# Patient Record
Sex: FEMALE | Race: WHITE | HISPANIC OR LATINO
[De-identification: names, ages, dates, MRNs, and addresses within clinical notes are randomized per-mention and may not be internally consistent; named-entity substitution may affect disease eponyms.]

---

## 2022-03-22 ENCOUNTER — NON-APPOINTMENT (OUTPATIENT)
Age: 62
End: 2022-03-22

## 2022-03-29 ENCOUNTER — APPOINTMENT (OUTPATIENT)
Dept: BREAST CENTER | Facility: CLINIC | Age: 62
End: 2022-03-29
Payer: MEDICAID

## 2022-03-29 VITALS
SYSTOLIC BLOOD PRESSURE: 146 MMHG | DIASTOLIC BLOOD PRESSURE: 90 MMHG | HEART RATE: 105 BPM | HEIGHT: 62 IN | BODY MASS INDEX: 31.33 KG/M2 | WEIGHT: 170.25 LBS

## 2022-03-29 DIAGNOSIS — Z78.9 OTHER SPECIFIED HEALTH STATUS: ICD-10-CM

## 2022-03-29 PROCEDURE — 99203 OFFICE O/P NEW LOW 30 MIN: CPT

## 2022-03-29 RX ORDER — MULTIVITAMIN
TABLET ORAL
Refills: 0 | Status: ACTIVE | COMMUNITY

## 2022-03-29 RX ORDER — PNV NO.95/FERROUS FUM/FOLIC AC 28MG-0.8MG
TABLET ORAL
Refills: 0 | Status: ACTIVE | COMMUNITY

## 2022-03-29 RX ORDER — LEVOTHYROXINE SODIUM 137 UG/1
TABLET ORAL
Refills: 0 | Status: ACTIVE | COMMUNITY

## 2022-03-30 NOTE — PHYSICAL EXAM
[Normocephalic] : normocephalic [Atraumatic] : atraumatic [Supple] : supple [No Supraclavicular Adenopathy] : no supraclavicular adenopathy [Examined in the supine and seated position] : examined in the supine and seated position [No dominant masses] : no dominant masses in right breast  [No dominant masses] : no dominant masses left breast [No Nipple Retraction] : no left nipple retraction [No Nipple Discharge] : no left nipple discharge [No Axillary Lymphadenopathy] : no left axillary lymphadenopathy [No Edema] : no edema [No Rashes] : no rashes [No Ulceration] : no ulceration [de-identified] : L C cup>>R small B cup

## 2022-03-30 NOTE — DATA REVIEWED
[FreeTextEntry1] : 12/13/2021 ([facility]) bilateral screening mammogram showing scattered areas of fibroglandular density, No suspicious masses, architectural distortion, or significant calcifications are detected. There are stable bilateral morphologically benign-appearing axillary tail lymph nodes. IMPRESSION: No mammographic evidence of malignancy. FOLLOW-UP: Follow-up imaging in 12 months. ASSESSMENT: BI-RADS Category 2: Benign

## 2022-03-30 NOTE — HISTORY OF PRESENT ILLNESS
[FreeTextEntry1] : Josue is a 62 year old Romanian speaking female ( #[id number]), referred by Dr. Bartolo Moreno (PCP), who presents for initial evaluation regarding a change in size in her left breast; she states that over the past year her left breast has grown in size. She states that she has breast asymmetry since she was 12 years old L > R.. Pt denies palpable masses, skin lesions/changes, nipple discharge, or other breast complaints.\par \par JENNIFER lifetime risk is 7.2% no family history of breast or ovarian cancer\par \par Patient states that she has gained around 10lbs in the past couple years.\par \par Discussed that this is not an uncommon issue. Imaging is benign so the likely cause is the recent weight gain. Recommended consult with plastic surgeon for matching procedure.

## 2022-03-30 NOTE — PAST MEDICAL HISTORY
[Menarche Age ____] : age at menarche was [unfilled] [Menopause Age____] : age at menopause was [unfilled] [Total Preg ___] : G[unfilled] [Live Births ___] : P[unfilled]  [Age At Live Birth ___] : Age at live birth: [unfilled] [History of Hormone Replacement Treatment] : has no history of hormone replacement treatment [FreeTextEntry6] : no [FreeTextEntry7] : no [FreeTextEntry8] : yes